# Patient Record
Sex: FEMALE | Race: BLACK OR AFRICAN AMERICAN | NOT HISPANIC OR LATINO | URBAN - METROPOLITAN AREA
[De-identification: names, ages, dates, MRNs, and addresses within clinical notes are randomized per-mention and may not be internally consistent; named-entity substitution may affect disease eponyms.]

---

## 2023-04-26 ENCOUNTER — OUTPATIENT (OUTPATIENT)
Dept: OUTPATIENT SERVICES | Facility: HOSPITAL | Age: 43
LOS: 1 days | End: 2023-04-26
Payer: COMMERCIAL

## 2023-04-26 VITALS
HEIGHT: 69 IN | TEMPERATURE: 99 F | SYSTOLIC BLOOD PRESSURE: 125 MMHG | RESPIRATION RATE: 17 BRPM | HEART RATE: 68 BPM | WEIGHT: 223.99 LBS | DIASTOLIC BLOOD PRESSURE: 76 MMHG | OXYGEN SATURATION: 100 %

## 2023-04-26 DIAGNOSIS — M16.12 UNILATERAL PRIMARY OSTEOARTHRITIS, LEFT HIP: ICD-10-CM

## 2023-04-26 DIAGNOSIS — F12.90 CANNABIS USE, UNSPECIFIED, UNCOMPLICATED: ICD-10-CM

## 2023-04-26 DIAGNOSIS — J45.909 UNSPECIFIED ASTHMA, UNCOMPLICATED: Chronic | ICD-10-CM

## 2023-04-26 DIAGNOSIS — B20 HUMAN IMMUNODEFICIENCY VIRUS [HIV] DISEASE: ICD-10-CM

## 2023-04-26 DIAGNOSIS — J45.909 UNSPECIFIED ASTHMA, UNCOMPLICATED: ICD-10-CM

## 2023-04-26 DIAGNOSIS — Z01.818 ENCOUNTER FOR OTHER PREPROCEDURAL EXAMINATION: ICD-10-CM

## 2023-04-26 DIAGNOSIS — Z96.641 PRESENCE OF RIGHT ARTIFICIAL HIP JOINT: Chronic | ICD-10-CM

## 2023-04-26 DIAGNOSIS — E78.5 HYPERLIPIDEMIA, UNSPECIFIED: ICD-10-CM

## 2023-04-26 LAB
ANION GAP SERPL CALC-SCNC: 7 MMOL/L — SIGNIFICANT CHANGE UP (ref 5–17)
APPEARANCE UR: CLEAR — SIGNIFICANT CHANGE UP
APTT BLD: 34.9 SEC — SIGNIFICANT CHANGE UP (ref 27.5–35.5)
BACTERIA # UR AUTO: ABNORMAL /HPF
BILIRUB UR-MCNC: NEGATIVE — SIGNIFICANT CHANGE UP
BLD GP AB SCN SERPL QL: SIGNIFICANT CHANGE UP
BUN SERPL-MCNC: 11 MG/DL — SIGNIFICANT CHANGE UP (ref 7–18)
CALCIUM SERPL-MCNC: 9.3 MG/DL — SIGNIFICANT CHANGE UP (ref 8.4–10.5)
CHLORIDE SERPL-SCNC: 108 MMOL/L — SIGNIFICANT CHANGE UP (ref 96–108)
CO2 SERPL-SCNC: 25 MMOL/L — SIGNIFICANT CHANGE UP (ref 22–31)
COLOR SPEC: YELLOW — SIGNIFICANT CHANGE UP
CREAT SERPL-MCNC: 0.87 MG/DL — SIGNIFICANT CHANGE UP (ref 0.5–1.3)
DIFF PNL FLD: ABNORMAL
EGFR: 84 ML/MIN/1.73M2 — SIGNIFICANT CHANGE UP
EPI CELLS # UR: SIGNIFICANT CHANGE UP /HPF
GLUCOSE SERPL-MCNC: 103 MG/DL — HIGH (ref 70–99)
GLUCOSE UR QL: NEGATIVE — SIGNIFICANT CHANGE UP
HCT VFR BLD CALC: 45.7 % — HIGH (ref 34.5–45)
HGB BLD-MCNC: 14.7 G/DL — SIGNIFICANT CHANGE UP (ref 11.5–15.5)
INR BLD: 0.98 RATIO — SIGNIFICANT CHANGE UP (ref 0.88–1.16)
KETONES UR-MCNC: NEGATIVE — SIGNIFICANT CHANGE UP
LEUKOCYTE ESTERASE UR-ACNC: NEGATIVE — SIGNIFICANT CHANGE UP
MCHC RBC-ENTMCNC: 30 PG — SIGNIFICANT CHANGE UP (ref 27–34)
MCHC RBC-ENTMCNC: 32.2 GM/DL — SIGNIFICANT CHANGE UP (ref 32–36)
MCV RBC AUTO: 93.3 FL — SIGNIFICANT CHANGE UP (ref 80–100)
MRSA PCR RESULT.: SIGNIFICANT CHANGE UP
NITRITE UR-MCNC: NEGATIVE — SIGNIFICANT CHANGE UP
NRBC # BLD: 0 /100 WBCS — SIGNIFICANT CHANGE UP (ref 0–0)
PH UR: 5 — SIGNIFICANT CHANGE UP (ref 5–8)
PLATELET # BLD AUTO: 251 K/UL — SIGNIFICANT CHANGE UP (ref 150–400)
POTASSIUM SERPL-MCNC: 4.2 MMOL/L — SIGNIFICANT CHANGE UP (ref 3.5–5.3)
POTASSIUM SERPL-SCNC: 4.2 MMOL/L — SIGNIFICANT CHANGE UP (ref 3.5–5.3)
PROT UR-MCNC: 15 MG/DL
PROTHROM AB SERPL-ACNC: 11.7 SEC — SIGNIFICANT CHANGE UP (ref 10.5–13.4)
RBC # BLD: 4.9 M/UL — SIGNIFICANT CHANGE UP (ref 3.8–5.2)
RBC # FLD: 13.8 % — SIGNIFICANT CHANGE UP (ref 10.3–14.5)
RBC CASTS # UR COMP ASSIST: ABNORMAL /HPF (ref 0–2)
S AUREUS DNA NOSE QL NAA+PROBE: SIGNIFICANT CHANGE UP
SODIUM SERPL-SCNC: 140 MMOL/L — SIGNIFICANT CHANGE UP (ref 135–145)
SP GR SPEC: 1.02 — SIGNIFICANT CHANGE UP (ref 1.01–1.02)
UROBILINOGEN FLD QL: NEGATIVE — SIGNIFICANT CHANGE UP
WBC # BLD: 5.15 K/UL — SIGNIFICANT CHANGE UP (ref 3.8–10.5)
WBC # FLD AUTO: 5.15 K/UL — SIGNIFICANT CHANGE UP (ref 3.8–10.5)
WBC UR QL: SIGNIFICANT CHANGE UP /HPF (ref 0–5)

## 2023-04-26 PROCEDURE — G0463: CPT

## 2023-04-26 PROCEDURE — 93005 ELECTROCARDIOGRAM TRACING: CPT

## 2023-04-26 PROCEDURE — 71046 X-RAY EXAM CHEST 2 VIEWS: CPT

## 2023-04-26 PROCEDURE — 87536 HIV-1 QUANT&REVRSE TRNSCRPJ: CPT

## 2023-04-26 PROCEDURE — 71046 X-RAY EXAM CHEST 2 VIEWS: CPT | Mod: 26

## 2023-04-26 RX ORDER — ATORVASTATIN CALCIUM 80 MG/1
1 TABLET, FILM COATED ORAL
Refills: 0 | DISCHARGE

## 2023-04-26 RX ORDER — ALBUTEROL 90 UG/1
2 AEROSOL, METERED ORAL
Refills: 0 | DISCHARGE

## 2023-04-26 RX ORDER — SODIUM CHLORIDE 9 MG/ML
3 INJECTION INTRAMUSCULAR; INTRAVENOUS; SUBCUTANEOUS EVERY 8 HOURS
Refills: 0 | Status: DISCONTINUED | OUTPATIENT
Start: 2023-05-09 | End: 2023-05-10

## 2023-04-26 RX ORDER — BICTEGRAVIR SODIUM, EMTRICITABINE, AND TENOFOVIR ALAFENAMIDE FUMARATE 30; 120; 15 MG/1; MG/1; MG/1
1 TABLET ORAL
Refills: 0 | DISCHARGE

## 2023-04-26 NOTE — CHART NOTE - NSCHARTNOTEFT_GEN_A_CORE
PRE-TJR SOCIAL/FUNCTIONAL SCREENING Via:    In Person Meet  on 04/26/2023    Preferred Language: English   Patient Telephone # [phone]  EMAIL ADDRESS: [email or web address]     Pt stated Goal for Surgery : " To walk better without pain."    SURGERY DETAILS:  Sx Date:  05/02/2023                                                                                           Surgery Type:     Left  Hip Replacement  Surgeon Dr Cordero       SOCIAL HISTORY:     Lives alone    in an    Apartment    Stairs Required to Access (Street to Front Door) Residence:   Yes; 4 steps to enter the building  and 2 flight of steps to enter the building      Once Inside Pt Residence:   To Access a Bathroom:      No Stairs Required    To Access a Bedroom Where Pt. Can Sleep:  No Stairs Required        Pt. Currently Has Formal Home Health Services:  No    MOBILITY HISTORY:   Baseline Ambulation- Pt is Independent in ambulation    Pt. Owns Any Other Medical Equipment?   Yes; RW. Pt needs a commode     MEDICAL HISTORY:  Pt has any History of Back Surgery:   No    Pt has any Allergies:  No   Patient denies diagnosis of sleep apnea or use of CPAP      Pt. will Require Transportation to Home Upon Discharge:  Yes;  will be Made Aware    For Post-Acute Care:  Pt. prefers [facility] for post-acute needs     Pt was provided with pre- op education book "What to do before and after hip replacement " and referred to it during Pre-op education.   Pt. was provided with, and educated on, hip kit. Hip Kit items include reacher, long-handled shoe-horn, long-handled bath/shower sponge and sock aid. Pt. verbalized understanding.    All questions were answered, pt. has my phone number for follow up as needed.

## 2023-04-26 NOTE — H&P PST ADULT - ASSESSMENT
42 year old female with history of HIV, asthma controlled with inhaler, HDL, marijuana user, presents with primary osteoarthritis of left hip. Pt stated she had a fall in 2021 and fall over onto her hips as her foot was caught. Pt had right hip replaced in March 14, 2022. Pt presents today with osteoarthritis of the left hip. Pt uses only Tylenol for pain 8/10 pt wants to be able to do all ADL's freely. All pre op blood work performed today. Pt scheduled for Left hip total replacement on 5/2/2023.

## 2023-04-26 NOTE — H&P PST ADULT - NSICDXPASTMEDICALHX_GEN_ALL_CORE_FT
PAST MEDICAL HISTORY:  Asthma     HIV disease     HLD (hyperlipidemia)     Marijuana user     Unilateral primary osteoarthritis, left hip

## 2023-04-26 NOTE — H&P PST ADULT - PROBLEM SELECTOR PLAN 5
scheduled for left total hip replacement.      Pt instructed to be NPO the night before surgery and the morning of surgery. Provided with chlorhexidene 4% solution to wash 3 days including the morning of surgery. Written instructions given to pt and reviewed directions with pt. Tylenol to be used prior to surgery.    Stop Bang Score=0 Pt is low risk for BONILLA

## 2023-04-26 NOTE — H&P PST ADULT - WILL THE PATIENT ACCEPT THE PFIZER COVID-19 VACCINE IF ELIGIBLE AND IT IS AVAILABLE?
· Total loss of approximately 40Lb since 11/2020 and 18Lb since admission  · Suspected 2/2 to poor diet, Nutrition consult placed, currently on strict calorie counting diet  · Will need f/u outpatient with PCP for further evaluation No

## 2023-04-26 NOTE — H&P PST ADULT - NSICDXFAMILYHX_GEN_ALL_CORE_FT
FAMILY HISTORY:  Father  Still living? Yes, Estimated age: 62  FH: COPD (chronic obstructive pulmonary disease), Age at diagnosis: Age Unknown    Sibling  Still living? Yes, Estimated age: 423  FH: asthma, Age at diagnosis: Age Unknown

## 2023-04-27 LAB
HIV-1 VIRAL LOAD RESULT: ABNORMAL
HIV1 RNA # SERPL NAA+PROBE: ABNORMAL COPIES/ML
HIV1 RNA SER-IMP: SIGNIFICANT CHANGE UP
HIV1 RNA SERPL NAA+PROBE-ACNC: ABNORMAL
HIV1 RNA SERPL NAA+PROBE-LOG#: ABNORMAL LG COP/ML

## 2023-05-09 ENCOUNTER — INPATIENT (INPATIENT)
Facility: HOSPITAL | Age: 43
LOS: 0 days | Discharge: HOME | DRG: 470 | End: 2023-05-10
Attending: ORTHOPAEDIC SURGERY | Admitting: ORTHOPAEDIC SURGERY
Payer: COMMERCIAL

## 2023-05-09 VITALS
WEIGHT: 223.99 LBS | RESPIRATION RATE: 16 BRPM | HEIGHT: 69 IN | DIASTOLIC BLOOD PRESSURE: 67 MMHG | TEMPERATURE: 98 F | SYSTOLIC BLOOD PRESSURE: 126 MMHG | HEART RATE: 69 BPM | OXYGEN SATURATION: 98 %

## 2023-05-09 DIAGNOSIS — M16.12 UNILATERAL PRIMARY OSTEOARTHRITIS, LEFT HIP: ICD-10-CM

## 2023-05-09 DIAGNOSIS — Z96.641 PRESENCE OF RIGHT ARTIFICIAL HIP JOINT: Chronic | ICD-10-CM

## 2023-05-09 LAB
A1C WITH ESTIMATED AVERAGE GLUCOSE RESULT: 6 % — HIGH (ref 4–5.6)
BLD GP AB SCN SERPL QL: SIGNIFICANT CHANGE UP
ESTIMATED AVERAGE GLUCOSE: 126 MG/DL — HIGH (ref 68–114)
GLUCOSE BLDC GLUCOMTR-MCNC: 103 MG/DL — HIGH (ref 70–99)
HCG UR QL: NEGATIVE — SIGNIFICANT CHANGE UP

## 2023-05-09 PROCEDURE — 72170 X-RAY EXAM OF PELVIS: CPT | Mod: 26

## 2023-05-09 PROCEDURE — 27066 REMOVE HIP BONE LES DEEP: CPT | Mod: AS,59,LT

## 2023-05-09 PROCEDURE — 27045 EXC HIP/PELV TUM DEEP 5 CM/>: CPT | Mod: AS,59,LT

## 2023-05-09 PROCEDURE — 27130 TOTAL HIP ARTHROPLASTY: CPT | Mod: AS,LT

## 2023-05-09 PROCEDURE — 88311 DECALCIFY TISSUE: CPT | Mod: 26

## 2023-05-09 PROCEDURE — 88305 TISSUE EXAM BY PATHOLOGIST: CPT | Mod: 26

## 2023-05-09 PROCEDURE — 64712 REVISION OF SCIATIC NERVE: CPT | Mod: AS,59,LT

## 2023-05-09 DEVICE — LINER ACET TRIDENT X3 10 DEG 36MM E: Type: IMPLANTABLE DEVICE | Site: LEFT | Status: FUNCTIONAL

## 2023-05-09 DEVICE — SHELL ACET TRIDENT II PLUS CLUSTERHOLE HA 52MM A: Type: IMPLANTABLE DEVICE | Site: LEFT | Status: FUNCTIONAL

## 2023-05-09 DEVICE — HEAD FEM CERAMIC V40 36MM OD -2.5MM: Type: IMPLANTABLE DEVICE | Site: LEFT | Status: FUNCTIONAL

## 2023-05-09 RX ORDER — CELECOXIB 200 MG/1
200 CAPSULE ORAL DAILY
Refills: 0 | Status: DISCONTINUED | OUTPATIENT
Start: 2023-05-10 | End: 2023-05-10

## 2023-05-09 RX ORDER — SODIUM CHLORIDE 9 MG/ML
1000 INJECTION INTRAMUSCULAR; INTRAVENOUS; SUBCUTANEOUS
Refills: 0 | Status: DISCONTINUED | OUTPATIENT
Start: 2023-05-09 | End: 2023-05-10

## 2023-05-09 RX ORDER — KETOROLAC TROMETHAMINE 30 MG/ML
30 SYRINGE (ML) INJECTION EVERY 6 HOURS
Refills: 0 | Status: DISCONTINUED | OUTPATIENT
Start: 2023-05-09 | End: 2023-05-10

## 2023-05-09 RX ORDER — OXYCODONE HYDROCHLORIDE 5 MG/1
5 TABLET ORAL EVERY 4 HOURS
Refills: 0 | Status: DISCONTINUED | OUTPATIENT
Start: 2023-05-09 | End: 2023-05-10

## 2023-05-09 RX ORDER — HYDROMORPHONE HYDROCHLORIDE 2 MG/ML
1 INJECTION INTRAMUSCULAR; INTRAVENOUS; SUBCUTANEOUS
Refills: 0 | Status: DISCONTINUED | OUTPATIENT
Start: 2023-05-09 | End: 2023-05-09

## 2023-05-09 RX ORDER — SODIUM CHLORIDE 9 MG/ML
1000 INJECTION, SOLUTION INTRAVENOUS
Refills: 0 | Status: DISCONTINUED | OUTPATIENT
Start: 2023-05-09 | End: 2023-05-09

## 2023-05-09 RX ORDER — ONDANSETRON 8 MG/1
4 TABLET, FILM COATED ORAL EVERY 6 HOURS
Refills: 0 | Status: DISCONTINUED | OUTPATIENT
Start: 2023-05-09 | End: 2023-05-10

## 2023-05-09 RX ORDER — PANTOPRAZOLE SODIUM 20 MG/1
40 TABLET, DELAYED RELEASE ORAL
Refills: 0 | Status: DISCONTINUED | OUTPATIENT
Start: 2023-05-09 | End: 2023-05-10

## 2023-05-09 RX ORDER — HYDROMORPHONE HYDROCHLORIDE 2 MG/ML
0.5 INJECTION INTRAMUSCULAR; INTRAVENOUS; SUBCUTANEOUS
Refills: 0 | Status: DISCONTINUED | OUTPATIENT
Start: 2023-05-09 | End: 2023-05-09

## 2023-05-09 RX ORDER — MAGNESIUM HYDROXIDE 400 MG/1
30 TABLET, CHEWABLE ORAL DAILY
Refills: 0 | Status: DISCONTINUED | OUTPATIENT
Start: 2023-05-09 | End: 2023-05-10

## 2023-05-09 RX ORDER — SENNA PLUS 8.6 MG/1
2 TABLET ORAL AT BEDTIME
Refills: 0 | Status: DISCONTINUED | OUTPATIENT
Start: 2023-05-09 | End: 2023-05-10

## 2023-05-09 RX ORDER — ONDANSETRON 8 MG/1
4 TABLET, FILM COATED ORAL ONCE
Refills: 0 | Status: DISCONTINUED | OUTPATIENT
Start: 2023-05-09 | End: 2023-05-09

## 2023-05-09 RX ORDER — CEFAZOLIN SODIUM 1 G
2000 VIAL (EA) INJECTION EVERY 8 HOURS
Refills: 0 | Status: COMPLETED | OUTPATIENT
Start: 2023-05-10 | End: 2023-05-10

## 2023-05-09 RX ORDER — FERROUS SULFATE 325(65) MG
325 TABLET ORAL DAILY
Refills: 0 | Status: DISCONTINUED | OUTPATIENT
Start: 2023-05-09 | End: 2023-05-10

## 2023-05-09 RX ORDER — TRAMADOL HYDROCHLORIDE 50 MG/1
50 TABLET ORAL ONCE
Refills: 0 | Status: DISCONTINUED | OUTPATIENT
Start: 2023-05-09 | End: 2023-05-09

## 2023-05-09 RX ORDER — ATORVASTATIN CALCIUM 80 MG/1
20 TABLET, FILM COATED ORAL AT BEDTIME
Refills: 0 | Status: DISCONTINUED | OUTPATIENT
Start: 2023-05-09 | End: 2023-05-10

## 2023-05-09 RX ORDER — TRAMADOL HYDROCHLORIDE 50 MG/1
50 TABLET ORAL EVERY 6 HOURS
Refills: 0 | Status: DISCONTINUED | OUTPATIENT
Start: 2023-05-09 | End: 2023-05-10

## 2023-05-09 RX ORDER — ENOXAPARIN SODIUM 100 MG/ML
30 INJECTION SUBCUTANEOUS EVERY 12 HOURS
Refills: 0 | Status: DISCONTINUED | OUTPATIENT
Start: 2023-05-10 | End: 2023-05-10

## 2023-05-09 RX ORDER — ALBUTEROL 90 UG/1
2 AEROSOL, METERED ORAL
Refills: 0 | Status: DISCONTINUED | OUTPATIENT
Start: 2023-05-09 | End: 2023-05-10

## 2023-05-09 RX ORDER — CELECOXIB 200 MG/1
200 CAPSULE ORAL ONCE
Refills: 0 | Status: COMPLETED | OUTPATIENT
Start: 2023-05-09 | End: 2023-05-09

## 2023-05-09 RX ORDER — ACETAMINOPHEN 500 MG
1000 TABLET ORAL EVERY 8 HOURS
Refills: 0 | Status: DISCONTINUED | OUTPATIENT
Start: 2023-05-09 | End: 2023-05-10

## 2023-05-09 RX ORDER — POLYETHYLENE GLYCOL 3350 17 G/17G
17 POWDER, FOR SOLUTION ORAL AT BEDTIME
Refills: 0 | Status: DISCONTINUED | OUTPATIENT
Start: 2023-05-09 | End: 2023-05-10

## 2023-05-09 RX ORDER — BICTEGRAVIR SODIUM, EMTRICITABINE, AND TENOFOVIR ALAFENAMIDE FUMARATE 30; 120; 15 MG/1; MG/1; MG/1
1 TABLET ORAL DAILY
Refills: 0 | Status: DISCONTINUED | OUTPATIENT
Start: 2023-05-09 | End: 2023-05-10

## 2023-05-09 RX ORDER — CHLORHEXIDINE GLUCONATE 213 G/1000ML
1 SOLUTION TOPICAL ONCE
Refills: 0 | Status: COMPLETED | OUTPATIENT
Start: 2023-05-09 | End: 2023-05-09

## 2023-05-09 RX ADMIN — ONDANSETRON 4 MILLIGRAM(S): 8 TABLET, FILM COATED ORAL at 22:21

## 2023-05-09 RX ADMIN — Medication 1000 MILLIGRAM(S): at 22:22

## 2023-05-09 RX ADMIN — HYDROMORPHONE HYDROCHLORIDE 0.5 MILLIGRAM(S): 2 INJECTION INTRAMUSCULAR; INTRAVENOUS; SUBCUTANEOUS at 21:09

## 2023-05-09 RX ADMIN — SODIUM CHLORIDE 3 MILLILITER(S): 9 INJECTION INTRAMUSCULAR; INTRAVENOUS; SUBCUTANEOUS at 22:10

## 2023-05-09 RX ADMIN — HYDROMORPHONE HYDROCHLORIDE 0.5 MILLIGRAM(S): 2 INJECTION INTRAMUSCULAR; INTRAVENOUS; SUBCUTANEOUS at 20:54

## 2023-05-09 RX ADMIN — CHLORHEXIDINE GLUCONATE 1 APPLICATION(S): 213 SOLUTION TOPICAL at 12:22

## 2023-05-09 RX ADMIN — Medication 30 MILLIGRAM(S): at 22:35

## 2023-05-09 RX ADMIN — Medication 30 MILLIGRAM(S): at 22:21

## 2023-05-09 RX ADMIN — Medication 1000 MILLIGRAM(S): at 22:35

## 2023-05-09 RX ADMIN — TRAMADOL HYDROCHLORIDE 50 MILLIGRAM(S): 50 TABLET ORAL at 12:23

## 2023-05-09 RX ADMIN — SODIUM CHLORIDE 3 MILLILITER(S): 9 INJECTION INTRAMUSCULAR; INTRAVENOUS; SUBCUTANEOUS at 11:58

## 2023-05-09 RX ADMIN — CELECOXIB 200 MILLIGRAM(S): 200 CAPSULE ORAL at 12:23

## 2023-05-09 NOTE — BRIEF OPERATIVE NOTE - NSICDXBRIEFPROCEDURE_GEN_ALL_CORE_FT
PROCEDURES:  Left hip replacement 26-Apr-2023 11:47:14 S/p L Total hip replacement Joselin Martinez

## 2023-05-09 NOTE — BRIEF OPERATIVE NOTE - NSICDXBRIEFPOSTOP_GEN_ALL_CORE_FT
POST-OP DIAGNOSIS:  Unilateral primary osteoarthritis, left hip 09-May-2023 18:19:11  Rafael Duarte

## 2023-05-10 VITALS — OXYGEN SATURATION: 98 % | HEART RATE: 58 BPM | SYSTOLIC BLOOD PRESSURE: 138 MMHG | DIASTOLIC BLOOD PRESSURE: 75 MMHG

## 2023-05-10 LAB
ANION GAP SERPL CALC-SCNC: 4 MMOL/L — LOW (ref 5–17)
BUN SERPL-MCNC: 12 MG/DL — SIGNIFICANT CHANGE UP (ref 7–18)
CALCIUM SERPL-MCNC: 8.8 MG/DL — SIGNIFICANT CHANGE UP (ref 8.4–10.5)
CHLORIDE SERPL-SCNC: 111 MMOL/L — HIGH (ref 96–108)
CO2 SERPL-SCNC: 24 MMOL/L — SIGNIFICANT CHANGE UP (ref 22–31)
CREAT SERPL-MCNC: 0.83 MG/DL — SIGNIFICANT CHANGE UP (ref 0.5–1.3)
EGFR: 89 ML/MIN/1.73M2 — SIGNIFICANT CHANGE UP
GLUCOSE SERPL-MCNC: 120 MG/DL — HIGH (ref 70–99)
HCT VFR BLD CALC: 37 % — SIGNIFICANT CHANGE UP (ref 34.5–45)
HGB BLD-MCNC: 12.2 G/DL — SIGNIFICANT CHANGE UP (ref 11.5–15.5)
MCHC RBC-ENTMCNC: 30.3 PG — SIGNIFICANT CHANGE UP (ref 27–34)
MCHC RBC-ENTMCNC: 33 GM/DL — SIGNIFICANT CHANGE UP (ref 32–36)
MCV RBC AUTO: 92 FL — SIGNIFICANT CHANGE UP (ref 80–100)
NRBC # BLD: 0 /100 WBCS — SIGNIFICANT CHANGE UP (ref 0–0)
PLATELET # BLD AUTO: 260 K/UL — SIGNIFICANT CHANGE UP (ref 150–400)
POTASSIUM SERPL-MCNC: 4.1 MMOL/L — SIGNIFICANT CHANGE UP (ref 3.5–5.3)
POTASSIUM SERPL-SCNC: 4.1 MMOL/L — SIGNIFICANT CHANGE UP (ref 3.5–5.3)
RBC # BLD: 4.02 M/UL — SIGNIFICANT CHANGE UP (ref 3.8–5.2)
RBC # FLD: 13.4 % — SIGNIFICANT CHANGE UP (ref 10.3–14.5)
SODIUM SERPL-SCNC: 139 MMOL/L — SIGNIFICANT CHANGE UP (ref 135–145)
WBC # BLD: 11.72 K/UL — HIGH (ref 3.8–10.5)
WBC # FLD AUTO: 11.72 K/UL — HIGH (ref 3.8–10.5)

## 2023-05-10 PROCEDURE — 88311 DECALCIFY TISSUE: CPT

## 2023-05-10 PROCEDURE — 72170 X-RAY EXAM OF PELVIS: CPT

## 2023-05-10 PROCEDURE — 85027 COMPLETE CBC AUTOMATED: CPT

## 2023-05-10 PROCEDURE — 81025 URINE PREGNANCY TEST: CPT

## 2023-05-10 PROCEDURE — 36415 COLL VENOUS BLD VENIPUNCTURE: CPT

## 2023-05-10 PROCEDURE — 86900 BLOOD TYPING SEROLOGIC ABO: CPT

## 2023-05-10 PROCEDURE — 83036 HEMOGLOBIN GLYCOSYLATED A1C: CPT

## 2023-05-10 PROCEDURE — 80048 BASIC METABOLIC PNL TOTAL CA: CPT

## 2023-05-10 PROCEDURE — 86850 RBC ANTIBODY SCREEN: CPT

## 2023-05-10 PROCEDURE — 88305 TISSUE EXAM BY PATHOLOGIST: CPT

## 2023-05-10 PROCEDURE — 86901 BLOOD TYPING SEROLOGIC RH(D): CPT

## 2023-05-10 PROCEDURE — C1776: CPT

## 2023-05-10 PROCEDURE — 82962 GLUCOSE BLOOD TEST: CPT

## 2023-05-10 PROCEDURE — 97162 PT EVAL MOD COMPLEX 30 MIN: CPT

## 2023-05-10 RX ORDER — OXYCODONE AND ACETAMINOPHEN 5; 325 MG/1; MG/1
1 TABLET ORAL
Qty: 28 | Refills: 0
Start: 2023-05-10 | End: 2023-05-16

## 2023-05-10 RX ORDER — ACETAMINOPHEN 500 MG
2 TABLET ORAL
Refills: 0 | DISCHARGE

## 2023-05-10 RX ORDER — HYDROMORPHONE HYDROCHLORIDE 2 MG/ML
0.5 INJECTION INTRAMUSCULAR; INTRAVENOUS; SUBCUTANEOUS ONCE
Refills: 0 | Status: DISCONTINUED | OUTPATIENT
Start: 2023-05-10 | End: 2023-05-10

## 2023-05-10 RX ORDER — ENOXAPARIN SODIUM 100 MG/ML
40 INJECTION SUBCUTANEOUS
Qty: 14 | Refills: 0
Start: 2023-05-10 | End: 2023-05-23

## 2023-05-10 RX ORDER — FERROUS SULFATE 325(65) MG
1 TABLET ORAL
Qty: 28 | Refills: 0
Start: 2023-05-10 | End: 2023-05-23

## 2023-05-10 RX ORDER — POLYETHYLENE GLYCOL 3350 17 G/17G
17 POWDER, FOR SOLUTION ORAL
Qty: 1 | Refills: 0
Start: 2023-05-10 | End: 2023-05-21

## 2023-05-10 RX ORDER — SENNA PLUS 8.6 MG/1
2 TABLET ORAL
Qty: 24 | Refills: 0
Start: 2023-05-10 | End: 2023-05-21

## 2023-05-10 RX ADMIN — Medication 1000 MILLIGRAM(S): at 06:11

## 2023-05-10 RX ADMIN — Medication 30 MILLIGRAM(S): at 06:11

## 2023-05-10 RX ADMIN — SODIUM CHLORIDE 3 MILLILITER(S): 9 INJECTION INTRAMUSCULAR; INTRAVENOUS; SUBCUTANEOUS at 05:35

## 2023-05-10 RX ADMIN — Medication 100 MILLIGRAM(S): at 08:39

## 2023-05-10 RX ADMIN — HYDROMORPHONE HYDROCHLORIDE 0.5 MILLIGRAM(S): 2 INJECTION INTRAMUSCULAR; INTRAVENOUS; SUBCUTANEOUS at 04:20

## 2023-05-10 RX ADMIN — HYDROMORPHONE HYDROCHLORIDE 0.5 MILLIGRAM(S): 2 INJECTION INTRAMUSCULAR; INTRAVENOUS; SUBCUTANEOUS at 04:35

## 2023-05-10 RX ADMIN — TRAMADOL HYDROCHLORIDE 50 MILLIGRAM(S): 50 TABLET ORAL at 00:41

## 2023-05-10 RX ADMIN — Medication 100 MILLIGRAM(S): at 00:41

## 2023-05-10 RX ADMIN — TRAMADOL HYDROCHLORIDE 50 MILLIGRAM(S): 50 TABLET ORAL at 01:45

## 2023-05-10 NOTE — PHYSICAL THERAPY INITIAL EVALUATION ADULT - ADDITIONAL COMMENTS
-- reports owning rolling walker, raised toilet seat and shower chair  -- Pt was advised to utilize rolling walker in hospital and upon d/c, she verbalized understanding and agreement  -- She reports owning hip kit

## 2023-05-10 NOTE — DISCHARGE NOTE PROVIDER - NSDCCPTREATMENT_GEN_ALL_CORE_FT
PRINCIPAL PROCEDURE  Procedure: Left hip replacement  Findings and Treatment: S/p L Total hip replacement on 5/9/23

## 2023-05-10 NOTE — DISCHARGE NOTE PROVIDER - HOSPITAL COURSE
Pt is a 43 y/o F who underwent elective L Total Hip replacement on 5/9/23 . No complications. Pt received daily Physical therapy and Deep vein thrombosis prophylaxis postoperatively. Stable for discharge home.

## 2023-05-10 NOTE — DISCHARGE NOTE NURSING/CASE MANAGEMENT/SOCIAL WORK - PATIENT PORTAL LINK FT
You can access the FollowMyHealth Patient Portal offered by [facility] by registering at the following website: [email or web address]. By joining [facility]’s FollowMyHealth portal, you will also be able to view your health information using other applications (apps) compatible with our system.

## 2023-05-10 NOTE — PHYSICAL THERAPY INITIAL EVALUATION ADULT - PRECAUTIONS/LIMITATIONS, REHAB EVAL
POSTERIOR HIP PRECAUTIONS reviewed with patient, including. NO FLEXION of surgical hip beyond 90 degrees; NO ADDuction of surgical limb past midline of the body; NO Internal rotation of surgical limb./fall precautions/left hip precautions/surgical precautions

## 2023-05-10 NOTE — CONSULT NOTE ADULT - ASSESSMENT
Confidential Drug Utilization Report  Search Terms: Alejandra Hidalgo, 1980Search Date: 05/10/2023 09:10:41 AM  The Drug Utilization Report below displays all of the controlled substance prescriptions, if any, that your patient has filled in the last twelve months. The information displayed on this report is compiled from pharmacy submissions to the Department, and accurately reflects the information as submitted by the pharmacies.    This report was requested by: uRpal Galindo | Reference #: [id number]    You have not added a NADJA number. Keeping your NADJA number(s) up to date on the My NADJA # tab will enable the separation of your prescriptions from others in the search results.    Practitioner Count: 0  Pharmacy Count: 0  Current Opioid Prescriptions: 0  Current Benzodiazepine Prescriptions: 0  Current Stimulant Prescriptions: 0      Patient Demographic Information (PDI)       PDI	First Name	Last Name	Birth Date	Gender	Street Address	[facility]	Zip Code  JESSIE Hidalgo	1980	Female	[address]	[facility]	[address]    Prescription Information      PDI Filter:    PDI	Current Rx	Drug Type	Rx Written	Rx Dispensed	Drug	Quantity	Days Supply	Prescriber Name	Prescriber NADJA #	Payment Method	Dispenser  A	N	O	07/26/2022	07/28/2022	oxycodone-acetaminophen 7.5-325 mg tablet	28	7	Ron Cordero MD	[id number]	Insurance	[facility] [place] [facility]    * - Details of Drug Type : O = Opioid, B = Benzodiazepine, S = Stimulant    * - Drugs marked with an asterisk are compound drugs. If the compound drug is made up of more than one controlled substance, then each controlled substance will be a separate row in the table.

## 2023-05-10 NOTE — PHYSICAL THERAPY INITIAL EVALUATION ADULT - ACTIVE RANGE OF MOTION EXAMINATION, REHAB EVAL
except at Lt hip, where Flexion beyond 90 deg., Internal Rotation, and Adduction not assessed seconday to hip precautions., at least 1/2 range Lt hip in standing./bilateral upper extremity Active ROM was WFL (within functional limits)/bilateral  lower extremity Active ROM was WFL (within functional limits)

## 2023-05-10 NOTE — DISCHARGE NOTE PROVIDER - NSDCFUADDINST_GEN_ALL_CORE_FT
Keep Dressing clean and dry   WBAT to LLE with appropriate assistive device  Patient is to follow up with Orthopedic Surgeon, Dr. Cordero in office in TWO WEEKS at: [phone]

## 2023-05-10 NOTE — PROGRESS NOTE ADULT - SUBJECTIVE AND OBJECTIVE BOX
Orthopedic Surgery    42yFemale    Diagnosis:  S/p Left Total Hip Replacement POD# 1    24hr interval:  Patient is seen and evaluated at bedside; offers no acute complaints. Pain is localized to the LLE; and well controlled with medication.  Denies CP/SOB, palpitations, dyspnea, paresthesias, N/V    Vital Signs Last 24 Hrs  T(C): 36.6 (10 May 2023 04:24), Max: 36.6 (09 May 2023 21:09)  T(F): 97.8 (10 May 2023 04:24), Max: 97.9 (09 May 2023 21:09)  HR: 69 (10 May 2023 04:24) (48 - 70)  BP: 104/81 (10 May 2023 04:24) (104/81 - 131/90)  BP(mean): 87 (10 May 2023 04:24) (85 - 101)  RR: 18 (10 May 2023 04:24) (12 - 18)  SpO2: 98% (10 May 2023 04:24) (95% - 100%)    Parameters below as of 10 May 2023 04:24  Patient On (Oxygen Delivery Method): room air      I&O's Detail    09 May 2023 07:01  -  10 May 2023 07:00  --------------------------------------------------------  IN:    Lactated Ringers Bolus: 1200 mL  Total IN: 1200 mL    OUT:    Blood Loss (mL): 100 mL  Total OUT: 100 mL    Total NET: 1100 mL          Physical Exam:    General: AAOx3, in NAD, resting comfortably in bed.  LLE:  Dressing changed today, Wound edges are well approximated and staples are intact.   Left hip dressing c/d/i  skin pink, warm  In nl alignment  Abduction pillow intact  no ct calves soft  nvi silt  2+ pulses DP/PT  compartments soft  5/5 strength of ehl/ta/gs b/l                          12.2   11.72 )-----------( 260      ( 10 May 2023 06:18 )             37.0     05-10    139  |  111<H>  |  12  ----------------------------<  120<H>  4.1   |  24  |  0.83    Ca    8.8      10 May 2023 06:18        Impression:  42yFemale S/p Left total hip replacement POD# 1  Plan:  - Dressing changed today   -  Continue pain management  -  DVT prophylaxis with SCD's, refused Lovenox  -  Daily Physical Therapy:  WBAT on LLE with appropriate assistive device  - *Total hip precautions  -  Discharge planning  -  Case d/w Dr. Cordero   Orthopedic Surgery    42yFemale    Diagnosis:  S/p Left Total Hip Replacement POD# 1    24hr interval:  Patient is seen and evaluated at bedside; offers no acute complaints. Pain is localized to the LLE; and well controlled with medication.  Denies CP/SOB, palpitations, dyspnea, paresthesias, N/V    Vital Signs Last 24 Hrs  T(C): 36.6 (10 May 2023 04:24), Max: 36.6 (09 May 2023 21:09)  T(F): 97.8 (10 May 2023 04:24), Max: 97.9 (09 May 2023 21:09)  HR: 69 (10 May 2023 04:24) (48 - 70)  BP: 104/81 (10 May 2023 04:24) (104/81 - 131/90)  BP(mean): 87 (10 May 2023 04:24) (85 - 101)  RR: 18 (10 May 2023 04:24) (12 - 18)  SpO2: 98% (10 May 2023 04:24) (95% - 100%)    Parameters below as of 10 May 2023 04:24  Patient On (Oxygen Delivery Method): room air      I&O's Detail    09 May 2023 07:01  -  10 May 2023 07:00  --------------------------------------------------------  IN:    Lactated Ringers Bolus: 1200 mL  Total IN: 1200 mL    OUT:    Blood Loss (mL): 100 mL  Total OUT: 100 mL    Total NET: 1100 mL          Physical Exam:    General: AAOx3, in NAD, resting comfortably in bed.  LLE:  Dressing changed today, Wound edges are well approximated and staples are intact.   Left hip dressing c/d/i  skin pink, warm  In nl alignment  Abduction pillow intact  no ct calves soft  nvi silt  2+ pulses DP/PT  compartments soft  5/5 strength of ehl/ta/gs b/l                          12.2   11.72 )-----------( 260      ( 10 May 2023 06:18 )             37.0     05-10    139  |  111<H>  |  12  ----------------------------<  120<H>  4.1   |  24  |  0.83    Ca    8.8      10 May 2023 06:18        Impression:  42yFemale S/p Left total hip replacement POD# 1  Plan:  - Dressing changed today   -  Continue pain management  -  DVT prophylaxis with SCD's, refused Lovenox  -  Daily Physical Therapy:  WBAT on LLE with appropriate assistive device  - *Total hip precautions  -  Discharge planning, patient walked out of hospital without walker against our advice.   -  Case d/w Dr. Cordero

## 2023-05-10 NOTE — DISCHARGE NOTE PROVIDER - NSDCCPCAREPLAN_GEN_ALL_CORE_FT
PRINCIPAL DISCHARGE DIAGNOSIS  Diagnosis: Unilateral primary osteoarthritis, left hip  Assessment and Plan of Treatment: Pain Management- See Attached Medication Reconciliation  **Posterior Hip Precautions for Total hip replacement***  Weight Bearing Status: WBAT to LLE with appropriate assistive device  Equipment needs: Commode, Walker  Dressing: Please keep bandage/dressing Clean, Dry, and Intact.  Incision Site: REMOVE STAPLES on 5/24/23  STAPLES AND DRESSING TO BE REMOVED BY NURSE  NURSE TO APPLY STERI-STRIPS TO THE WOUND AFTER STAPLE REMOVAL   Dvt prophylaxis: D/C LOVENOX on 5/24/23  PT/Occupational Therapy are Activities of Daily Living as appropriate  Follow up with Orthopedic Surgeon Dr. Cordero at [phone] after TAHIRA ARE REMOVED

## 2023-05-10 NOTE — PHYSICAL THERAPY INITIAL EVALUATION ADULT - LIVES WITH, PROFILE
(but states urvashi will be staying with her) in apartment, with elevator present. 2 stairs to access elevator w/ b/l railings/alone

## 2023-05-10 NOTE — DISCHARGE NOTE NURSING/CASE MANAGEMENT/SOCIAL WORK - NSDCPEFALRISK_GEN_ALL_CORE
For information on Fall & Injury Prevention, visit: [email or web address] OR  [email or web address] OR  https://www.cdc.gov/steadi/patient.html

## 2023-05-10 NOTE — PHYSICAL THERAPY INITIAL EVALUATION ADULT - DIAGNOSIS, PT EVAL
(ICF Model) Pt. present w/deficits in Body Structures/Function (Impairments), incl: Strength, Balance leading to deficits in performing the below noted Activities (Limitations).

## 2023-05-10 NOTE — PHYSICAL THERAPY INITIAL EVALUATION ADULT - PASSIVE RANGE OF MOTION EXAMINATION, REHAB EVAL
except at Lt hip, where Flexion beyond 90 deg., Internal Rotation, and Adduction not assessed seconday to hip precautions., at least 1/2 range Lt hip in standing./bilateral upper extremity Passive ROM was WFL (within functional limits)/bilateral lower extremity Passive ROM was WFL (within functional limits)

## 2023-05-10 NOTE — DISCHARGE NOTE PROVIDER - CARE PROVIDER_API CALL
Ron Cordero)  Orthopaedic Surgery; Sports Medicine  [address] [place] [address], [address] [place] [address]  Phone: [phone]  Fax: [phone]  Follow Up Time: 2 weeks

## 2023-05-10 NOTE — PHYSICAL THERAPY INITIAL EVALUATION ADULT - GENERAL OBSERVATIONS, REHAB EVAL
Consult received, chart reviewed. Patient received supine in bed, NAD. Patient agreed to EVALUATION from Physical Therapist. Fiancee bedside for eval.

## 2023-05-10 NOTE — DISCHARGE NOTE PROVIDER - NSDCMRMEDTOKEN_GEN_ALL_CORE_FT
Albuterol (Eqv-ProAir HFA) 90 mcg/inh inhalation aerosol: 2 inhaled 2 times a day as needed for  shortness of breath and/or wheezing  Biktarvy 30 mg-120 mg-15 mg oral tablet: 1 orally once a day  Lipitor 20 mg oral tablet: 1 orally once a day  Lovenox 40 mg/0.4 mL injectable solution: 40 milligram(s) subcutaneously once a day  MiraLax oral powder for reconstitution: 17 gram(s) orally once a day (at bedtime) as needed for  constipation MDD: 1  Percocet 5 mg-325 mg oral tablet: 1 tab(s) orally every 6 hours as needed for  severe pain MDD: 4  senna (sennosides) 17 mg oral tablet: 2 tab(s) orally once a day (at bedtime) as needed for  constipation

## 2023-05-17 LAB — SURGICAL PATHOLOGY STUDY: SIGNIFICANT CHANGE UP

## (undated) DEVICE — DRAPE FOR 2 TIER TABLE W/ 8" BACK 72" LONG

## (undated) DEVICE — SOL IRR BAG NS 0.9% 3000ML

## (undated) DEVICE — POSITIONER STIRRUP STRAP W SLIP RING 19X3.5"

## (undated) DEVICE — DRAPE 1/2 SHEET 40X57"

## (undated) DEVICE — FOLEY TRAY 16FR SURESTEP LTX BG

## (undated) DEVICE — GOWN XXL

## (undated) DEVICE — NDL HYPO SAFE 22G X 1.5" (BLACK)

## (undated) DEVICE — DRAPE TOWEL BLUE 17" X 24"

## (undated) DEVICE — GLV 8.5 PROTEXIS (BLUE)

## (undated) DEVICE — MEDICATION LABELS W MARKER

## (undated) DEVICE — PREP BETADINE SPONGE STICKS

## (undated) DEVICE — PACK TOTAL HIP FH.

## (undated) DEVICE — VENODYNE/SCD SLEEVE CALF MEDIUM

## (undated) DEVICE — SUT POLYSORB 1 18" UNDYED

## (undated) DEVICE — ELCTR AQUAMANTYS BIPOLAR SEALER 6.0

## (undated) DEVICE — SUT TICRON 2 36" GS-21

## (undated) DEVICE — SYR LUER LOK 20CC

## (undated) DEVICE — DRSG ADAPTIC 3 X 8"

## (undated) DEVICE — SOL IRR POUR H2O 1500ML

## (undated) DEVICE — WARMING BLANKET UPPER ADULT

## (undated) DEVICE — SOL IRR POUR NS 0.9% 1500ML

## (undated) DEVICE — SUT HEWSON RETRIEVER

## (undated) DEVICE — SUT POLYSORB 2-0 30" GS-10 UNDYED

## (undated) DEVICE — ELCTR GROUNDING PAD ADULT COVIDIEN

## (undated) DEVICE — DRAPE LIGHT HANDLE COVER (BLUE)

## (undated) DEVICE — FOR-ESU VALLEYLAB T6D04509DX: Type: DURABLE MEDICAL EQUIPMENT

## (undated) DEVICE — DRSG MEDIPORE DRESS IT 7-7/8 X 11"

## (undated) DEVICE — POSITIONER FOAM ABDUCTION PILLOW MED (PINK)

## (undated) DEVICE — POSITIONER FOAM MATTRESS PAD CONVOLUTED (PINK)

## (undated) DEVICE — DRAPE SHOWER CURTAIN ISOLATION

## (undated) DEVICE — HOOD FLYTE STRYKER HELMET SHIELD

## (undated) DEVICE — SAW BLADE STRYKER RECIPROCATING 77.6X0.77X11.2MM

## (undated) DEVICE — GLV 8 PROTEXIS (CREAM) MICRO